# Patient Record
Sex: MALE | Race: WHITE | Employment: UNEMPLOYED | URBAN - METROPOLITAN AREA
[De-identification: names, ages, dates, MRNs, and addresses within clinical notes are randomized per-mention and may not be internally consistent; named-entity substitution may affect disease eponyms.]

---

## 2024-03-01 ENCOUNTER — HOSPITAL ENCOUNTER (OUTPATIENT)
Age: 39
Discharge: HOME | End: 2024-03-01
Payer: COMMERCIAL

## 2024-03-01 ENCOUNTER — APPOINTMENT (OUTPATIENT)
Dept: GENERAL RADIOLOGY | Age: 39
End: 2024-03-01
Attending: PHYSICIAN ASSISTANT
Payer: COMMERCIAL

## 2024-03-01 VITALS
WEIGHT: 240 LBS | BODY MASS INDEX: 36.37 KG/M2 | HEART RATE: 88 BPM | TEMPERATURE: 97 F | OXYGEN SATURATION: 96 % | DIASTOLIC BLOOD PRESSURE: 79 MMHG | RESPIRATION RATE: 16 BRPM | HEIGHT: 68 IN | SYSTOLIC BLOOD PRESSURE: 120 MMHG

## 2024-03-01 DIAGNOSIS — J40 BRONCHITIS: Primary | ICD-10-CM

## 2024-03-01 DIAGNOSIS — H66.90 ACUTE OTITIS MEDIA, UNSPECIFIED OTITIS MEDIA TYPE: ICD-10-CM

## 2024-03-01 DIAGNOSIS — J01.00 ACUTE NON-RECURRENT MAXILLARY SINUSITIS: ICD-10-CM

## 2024-03-01 LAB
POCT INFLUENZA A: NEGATIVE
POCT INFLUENZA B: NEGATIVE
SARS-COV-2 RNA RESP QL NAA+PROBE: NOT DETECTED

## 2024-03-01 PROCEDURE — 99203 OFFICE O/P NEW LOW 30 MIN: CPT | Performed by: PHYSICIAN ASSISTANT

## 2024-03-01 PROCEDURE — 87502 INFLUENZA DNA AMP PROBE: CPT | Performed by: PHYSICIAN ASSISTANT

## 2024-03-01 PROCEDURE — U0002 COVID-19 LAB TEST NON-CDC: HCPCS | Performed by: PHYSICIAN ASSISTANT

## 2024-03-01 PROCEDURE — 71046 X-RAY EXAM CHEST 2 VIEWS: CPT | Performed by: PHYSICIAN ASSISTANT

## 2024-03-01 RX ORDER — BENZONATATE 100 MG/1
100 CAPSULE ORAL 3 TIMES DAILY PRN
Qty: 21 CAPSULE | Refills: 0 | Status: SHIPPED | OUTPATIENT
Start: 2024-03-01 | End: 2024-03-08

## 2024-03-01 RX ORDER — AZITHROMYCIN 250 MG/1
TABLET, FILM COATED ORAL
Qty: 6 TABLET | Refills: 0 | Status: SHIPPED | OUTPATIENT
Start: 2024-03-01 | End: 2024-03-01

## 2024-03-01 RX ORDER — ALBUTEROL SULFATE 90 UG/1
2 AEROSOL, METERED RESPIRATORY (INHALATION) EVERY 4 HOURS PRN
Qty: 1 EACH | Refills: 0 | Status: SHIPPED | OUTPATIENT
Start: 2024-03-01 | End: 2024-03-01

## 2024-03-01 RX ORDER — ALBUTEROL SULFATE 90 UG/1
2 AEROSOL, METERED RESPIRATORY (INHALATION) EVERY 4 HOURS PRN
Qty: 1 EACH | Refills: 0 | Status: SHIPPED | OUTPATIENT
Start: 2024-03-01 | End: 2024-03-31

## 2024-03-01 RX ORDER — AZITHROMYCIN 250 MG/1
TABLET, FILM COATED ORAL
Qty: 6 TABLET | Refills: 0 | Status: SHIPPED | OUTPATIENT
Start: 2024-03-01 | End: 2024-03-06

## 2024-03-01 NOTE — ED INITIAL ASSESSMENT (HPI)
Pt presents to the IC with c/o a cough, fever, fatigue, body aches since Saturday, worse in the last 2 days.

## 2024-03-01 NOTE — ED PROVIDER NOTES
Chief Complaint   Patient presents with    Cough/URI       HPI:     Ghulam Loving is a 38 year old male who presents for evaluation of congestion cough body aches over the last week.  Sick contacts include spouse pending evaluation.  Notes previous history of bronchitis with similar symptoms.  Denies active inhaler use he had previous use with success.  Low-grade temperature last few days with TheraFlu this morning, afebrile on arrival.  Notes sinus congestion and ear pain.  Denies headache dizziness sore throat dysphagia neck pain chest pain shortness of breath abdominal pain vomiting diarrhea dysuria or rash.  Tolerating p.o. okay.      PFSH    PFSH asessment screens reviewed and agree.  Nurses notes reviewed I agree with documentation.    No family history on file.  Family history reviewed with patient/caregiver and is not pertinent to presenting problem.  Social History     Socioeconomic History    Marital status:      Spouse name: Not on file    Number of children: Not on file    Years of education: Not on file    Highest education level: Not on file   Occupational History    Not on file   Tobacco Use    Smoking status: Never    Smokeless tobacco: Never   Substance and Sexual Activity    Alcohol use: Not on file    Drug use: Not on file    Sexual activity: Not on file   Other Topics Concern    Not on file   Social History Narrative    Not on file     Social Determinants of Health     Financial Resource Strain: Not on file   Food Insecurity: Not on file   Transportation Needs: Not on file   Physical Activity: Not on file   Stress: Not on file   Social Connections: Not on file   Housing Stability: Not on file         ROS:   Positive for stated complaint: Nasal congestion, ear pain, cough.  All other systems reviewed and negative except as noted above.  Constitutional and Vital Signs Reviewed.      Physical Exam:     Findings:    /79   Pulse 88   Temp 97.2 °F (36.2 °C) (Temporal)   Resp 16    Ht 172.7 cm (5' 8\")   Wt 108.9 kg   SpO2 96%   BMI 36.49 kg/m²   GENERAL: well developed, well nourished, well hydrated, no distress  SKIN: good skin turgor, no obvious rashes  NECK: No nuchal rigidity.  Supple, no adenopathy  EXTREMITIES: no cyanosis or edema. GRANADOS without difficulty  GI: soft, non-tender, normal bowel sounds  HEAD: normocephalic, atraumatic  EYES: sclera non icteric bilateral, conjunctiva clear  EARS: Mild erythema bilateral inner ear canal.  No effusion.  TMs intact.  No external or mastoid tenderness.  NOSE: Mild maxillary sinus tenderness.  Positive rhinorrhea.  MMM.  Nasal turbinates: pink, normal mucosa  THROAT: clear, without exudates, uvula midline, and airway patent  LUNGS: Coarse lung sounds bases, expiratory wheeze left base.  No retractions.  No rales, rhonchi  NEURO: No focal deficits  PSYCH: Alert and oriented x3.  Answering questions appropriately.  Mood appropriate.    MDM/Assessment/Plan:   Orders for this encounter:    Orders Placed This Encounter    XR CHEST PA + LAT CHEST (XIJ=[id number])     Order Specific Question:   What is the Relevant Clinical Indication / Reason for Exam?     Answer:   cough, r/o infiltrate     Order Specific Question:   Release to patient     Answer:   Immediate    Rapid SARS-CoV-2 by PCR     Order Specific Question:   Release to patient     Answer:   Immediate    POCT Flu Test     Order Specific Question:   Release to patient     Answer:   Immediate    albuterol 108 (90 Base) MCG/ACT Inhalation Aero Soln     Sig: Inhale 2 puffs into the lungs every 4 (four) hours as needed for Wheezing.     Dispense:  1 each     Refill:  0    azithromycin (ZITHROMAX Z-PAYAL) 250 MG Oral Tab     Si mg once followed by 250 mg daily x 4 days     Dispense:  6 tablet     Refill:  0    benzonatate 100 MG Oral Cap     Sig: Take 1 capsule (100 mg total) by mouth 3 (three) times daily as needed for cough.     Dispense:  21 capsule     Refill:  0       Labs performed this  visit:  Recent Results (from the past 10 hour(s))   Rapid SARS-CoV-2 by PCR    Collection Time: 03/01/24  9:14 AM    Specimen: Nares; Other   Result Value Ref Range    Rapid SARS-CoV-2 by PCR Not Detected Not Detected   POCT Flu Test    Collection Time: 03/01/24  9:14 AM    Specimen: Nares; Other   Result Value Ref Range    POCT INFLUENZA A Negative Negative    POCT INFLUENZA B Negative Negative       MDM:  Swabs negative, chest x-ray infiltrate, examination most reflective of sinusitis with associated bronchitis and otitis media.  Patient requesting empiric coverage based on duration of symptoms and exam with antibiotics to cover bacterially.  Instructed likely viral etiology beyond capacity of our facility.  Agrees with bronchodilator with previous use for supportive bronchospasm evaluation.  Also provided cough suppressant in conjunction with therapy.  Happy with plan of care will readdress outpatient or go to the ER for breakthrough changes as advised.    Diagnosis:    ICD-10-CM    1. Bronchitis  J40 XR CHEST PA + LAT CHEST (CPT=71046)     XR CHEST PA + LAT CHEST (CPT=71046)      2. Acute non-recurrent maxillary sinusitis  J01.00       3. Acute otitis media, unspecified otitis media type  H66.90           All results reviewed and discussed with patient.  See AVS for detailed discharge instructions for your condition today.    Follow Up with:  Yazan Pringle MD  [address] [place] [id number] [phone]    Schedule an appointment as soon as possible for a visit in 1 week  As needed, If symptoms worsen

## 2024-04-02 ENCOUNTER — LAB ENCOUNTER (OUTPATIENT)
Dept: LAB | Age: 39
End: 2024-04-02
Attending: STUDENT IN AN ORGANIZED HEALTH CARE EDUCATION/TRAINING PROGRAM
Payer: COMMERCIAL

## 2024-04-02 ENCOUNTER — OFFICE VISIT (OUTPATIENT)
Dept: INTERNAL MEDICINE CLINIC | Facility: CLINIC | Age: 39
End: 2024-04-02

## 2024-04-02 VITALS
WEIGHT: 249 LBS | SYSTOLIC BLOOD PRESSURE: 113 MMHG | OXYGEN SATURATION: 98 % | DIASTOLIC BLOOD PRESSURE: 73 MMHG | TEMPERATURE: 98 F | HEART RATE: 77 BPM | HEIGHT: 68 IN | BODY MASS INDEX: 37.74 KG/M2

## 2024-04-02 DIAGNOSIS — Z00.00 ANNUAL PHYSICAL EXAM: Primary | ICD-10-CM

## 2024-04-02 DIAGNOSIS — J01.90 ACUTE BACTERIAL SINUSITIS: ICD-10-CM

## 2024-04-02 DIAGNOSIS — Z00.00 ANNUAL PHYSICAL EXAM: ICD-10-CM

## 2024-04-02 DIAGNOSIS — G47.33 OSA (OBSTRUCTIVE SLEEP APNEA): ICD-10-CM

## 2024-04-02 DIAGNOSIS — B96.89 ACUTE BACTERIAL SINUSITIS: ICD-10-CM

## 2024-04-02 DIAGNOSIS — E55.9 VITAMIN D DEFICIENCY: ICD-10-CM

## 2024-04-02 DIAGNOSIS — J32.9 SINUSITIS, UNSPECIFIED CHRONICITY, UNSPECIFIED LOCATION: ICD-10-CM

## 2024-04-02 DIAGNOSIS — E66.9 CLASS II OBESITY: ICD-10-CM

## 2024-04-02 DIAGNOSIS — Z76.89 ENCOUNTER TO ESTABLISH CARE WITH NEW DOCTOR: ICD-10-CM

## 2024-04-02 DIAGNOSIS — Z23 NEED FOR TETANUS, DIPHTHERIA, AND ACELLULAR PERTUSSIS (TDAP) VACCINE: ICD-10-CM

## 2024-04-02 DIAGNOSIS — J45.41 MODERATE PERSISTENT ASTHMA WITH ACUTE EXACERBATION (HCC): ICD-10-CM

## 2024-04-02 DIAGNOSIS — E55.9 VITAMIN D DEFICIENCY: Primary | ICD-10-CM

## 2024-04-02 LAB
ALBUMIN SERPL-MCNC: 4.5 G/DL (ref 3.2–4.8)
ALBUMIN/GLOB SERPL: 1.5 {RATIO} (ref 1–2)
ALP LIVER SERPL-CCNC: 82 U/L
ALT SERPL-CCNC: 57 U/L
ANION GAP SERPL CALC-SCNC: 6 MMOL/L (ref 0–18)
AST SERPL-CCNC: 32 U/L (ref ?–34)
ATRIAL RATE: 63 BPM
BASOPHILS # BLD AUTO: 0.06 X10(3) UL (ref 0–0.2)
BASOPHILS NFR BLD AUTO: 0.8 %
BILIRUB SERPL-MCNC: 0.4 MG/DL (ref 0.3–1.2)
BUN BLD-MCNC: 13 MG/DL (ref 9–23)
BUN/CREAT SERPL: 13.4 (ref 10–20)
CALCIUM BLD-MCNC: 9.8 MG/DL (ref 8.7–10.4)
CHLORIDE SERPL-SCNC: 106 MMOL/L (ref 98–112)
CHOLEST SERPL-MCNC: 201 MG/DL (ref ?–200)
CO2 SERPL-SCNC: 27 MMOL/L (ref 21–32)
CREAT BLD-MCNC: 0.97 MG/DL
DEPRECATED RDW RBC AUTO: 38.8 FL (ref 35.1–46.3)
EGFRCR SERPLBLD CKD-EPI 2021: 101 ML/MIN/1.73M2 (ref 60–?)
EOSINOPHIL # BLD AUTO: 0.16 X10(3) UL (ref 0–0.7)
EOSINOPHIL NFR BLD AUTO: 2 %
ERYTHROCYTE [DISTWIDTH] IN BLOOD BY AUTOMATED COUNT: 12.8 % (ref 11–15)
EST. AVERAGE GLUCOSE BLD GHB EST-MCNC: 108 MG/DL (ref 68–126)
FASTING PATIENT LIPID ANSWER: YES
FASTING STATUS PATIENT QL REPORTED: YES
GLOBULIN PLAS-MCNC: 3.1 G/DL (ref 2.8–4.4)
GLUCOSE BLD-MCNC: 94 MG/DL (ref 70–99)
HBA1C MFR BLD: 5.4 % (ref ?–5.7)
HCT VFR BLD AUTO: 46 %
HDLC SERPL-MCNC: 34 MG/DL (ref 40–59)
HGB BLD-MCNC: 15.7 G/DL
IMM GRANULOCYTES # BLD AUTO: 0.02 X10(3) UL (ref 0–1)
IMM GRANULOCYTES NFR BLD: 0.3 %
LDLC SERPL CALC-MCNC: 138 MG/DL (ref ?–100)
LYMPHOCYTES # BLD AUTO: 2.9 X10(3) UL (ref 1–4)
LYMPHOCYTES NFR BLD AUTO: 37.1 %
MCH RBC QN AUTO: 28.9 PG (ref 26–34)
MCHC RBC AUTO-ENTMCNC: 34.1 G/DL (ref 31–37)
MCV RBC AUTO: 84.6 FL
MONOCYTES # BLD AUTO: 0.69 X10(3) UL (ref 0.1–1)
MONOCYTES NFR BLD AUTO: 8.8 %
NEUTROPHILS # BLD AUTO: 3.98 X10 (3) UL (ref 1.5–7.7)
NEUTROPHILS # BLD AUTO: 3.98 X10(3) UL (ref 1.5–7.7)
NEUTROPHILS NFR BLD AUTO: 51 %
NONHDLC SERPL-MCNC: 167 MG/DL (ref ?–130)
OSMOLALITY SERPL CALC.SUM OF ELEC: 288 MOSM/KG (ref 275–295)
P AXIS: 30 DEGREES
P-R INTERVAL: 148 MS
PLATELET # BLD AUTO: 255 10(3)UL (ref 150–450)
POTASSIUM SERPL-SCNC: 4.3 MMOL/L (ref 3.5–5.1)
PROT SERPL-MCNC: 7.6 G/DL (ref 5.7–8.2)
Q-T INTERVAL: 378 MS
QRS DURATION: 78 MS
QTC CALCULATION (BEZET): 386 MS
R AXIS: 58 DEGREES
RBC # BLD AUTO: 5.44 X10(6)UL
SODIUM SERPL-SCNC: 139 MMOL/L (ref 136–145)
T AXIS: 38 DEGREES
T4 FREE SERPL-MCNC: 1.1 NG/DL (ref 0.8–1.7)
TRIGL SERPL-MCNC: 162 MG/DL (ref 30–149)
TSI SER-ACNC: 5.9 MIU/ML (ref 0.55–4.78)
VENTRICULAR RATE: 63 BPM
VIT D+METAB SERPL-MCNC: 22.7 NG/ML (ref 30–100)
VLDLC SERPL CALC-MCNC: 30 MG/DL (ref 0–30)
WBC # BLD AUTO: 7.8 X10(3) UL (ref 4–11)

## 2024-04-02 PROCEDURE — 82306 VITAMIN D 25 HYDROXY: CPT

## 2024-04-02 PROCEDURE — 36415 COLL VENOUS BLD VENIPUNCTURE: CPT

## 2024-04-02 PROCEDURE — 80053 COMPREHEN METABOLIC PANEL: CPT

## 2024-04-02 PROCEDURE — 99204 OFFICE O/P NEW MOD 45 MIN: CPT | Performed by: STUDENT IN AN ORGANIZED HEALTH CARE EDUCATION/TRAINING PROGRAM

## 2024-04-02 PROCEDURE — 90715 TDAP VACCINE 7 YRS/> IM: CPT | Performed by: STUDENT IN AN ORGANIZED HEALTH CARE EDUCATION/TRAINING PROGRAM

## 2024-04-02 PROCEDURE — 83036 HEMOGLOBIN GLYCOSYLATED A1C: CPT

## 2024-04-02 PROCEDURE — 85025 COMPLETE CBC W/AUTO DIFF WBC: CPT

## 2024-04-02 PROCEDURE — 84443 ASSAY THYROID STIM HORMONE: CPT

## 2024-04-02 PROCEDURE — 84439 ASSAY OF FREE THYROXINE: CPT

## 2024-04-02 PROCEDURE — 90471 IMMUNIZATION ADMIN: CPT | Performed by: STUDENT IN AN ORGANIZED HEALTH CARE EDUCATION/TRAINING PROGRAM

## 2024-04-02 PROCEDURE — 80061 LIPID PANEL: CPT

## 2024-04-02 PROCEDURE — 99386 PREV VISIT NEW AGE 40-64: CPT | Performed by: STUDENT IN AN ORGANIZED HEALTH CARE EDUCATION/TRAINING PROGRAM

## 2024-04-02 PROCEDURE — 93005 ELECTROCARDIOGRAM TRACING: CPT

## 2024-04-02 PROCEDURE — 93010 ELECTROCARDIOGRAM REPORT: CPT | Performed by: STUDENT IN AN ORGANIZED HEALTH CARE EDUCATION/TRAINING PROGRAM

## 2024-04-02 RX ORDER — FLUTICASONE PROPIONATE AND SALMETEROL 100; 50 UG/1; UG/1
1 POWDER RESPIRATORY (INHALATION) 2 TIMES DAILY
Qty: 60 EACH | Refills: 3 | Status: SHIPPED | OUTPATIENT
Start: 2024-04-02 | End: 2025-04-02

## 2024-04-02 RX ORDER — AMOXICILLIN AND CLAVULANATE POTASSIUM 875; 125 MG/1; MG/1
1 TABLET, FILM COATED ORAL 2 TIMES DAILY
Qty: 20 TABLET | Refills: 0 | Status: SHIPPED | OUTPATIENT
Start: 2024-04-02 | End: 2024-04-12

## 2024-04-02 RX ORDER — METHYLPREDNISOLONE 4 MG/1
TABLET ORAL
Qty: 1 EACH | Refills: 0 | Status: SHIPPED | OUTPATIENT
Start: 2024-04-02

## 2024-04-02 RX ORDER — FLUTICASONE PROPIONATE 50 MCG
2 SPRAY, SUSPENSION (ML) NASAL DAILY
Qty: 16 EACH | Refills: 3 | Status: SHIPPED | OUTPATIENT
Start: 2024-04-02

## 2024-04-02 RX ORDER — CHOLECALCIFEROL (VITAMIN D3) 125 MCG
1 CAPSULE ORAL DAILY
Qty: 90 CAPSULE | Refills: 3 | Status: SHIPPED | OUTPATIENT
Start: 2024-04-02 | End: 2025-03-28

## 2024-04-02 RX ORDER — AZELASTINE HYDROCHLORIDE 137 UG/1
1-2 SPRAY, METERED NASAL 2 TIMES DAILY
Qty: 30 ML | Refills: 3 | Status: SHIPPED | OUTPATIENT
Start: 2024-04-02
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
## 2024-04-02 NOTE — PROGRESS NOTES
No action needed if read by patient:  Adryan Parmar, your EKG shows normal sinus rhythm. This is a good baseline to have should we decide to start phentermine/ (weight loss medication).

## 2024-04-02 NOTE — PATIENT INSTRUCTIONS
I recommend to eat a more balanced mediterranean diet (eat more vegetables and fruits) more omega 3 fatty acids, lean protein (chicken, turkey, seafood), less process/fried fatty foods, less red met, and mixed aerobic exercise 180 minutes a week and intermittent strength training. And follow up in 3 months     Nutritional Goals Reviewed and Discussed:   Limit Carbohydrates to 100gm per day, Eat 100-200 calories within 1 hour of awkaing up, Eat 3-4 cups of fresh fruits or vegetables daily    Behavior Modification Reviewed and Discussed:  Eat breakfast, Eat 3 meals per day, Plan meals in advance (if unable to cook, meal prep service such as [email or web address]) High protein, Low simple carbs. Read nutrition labels track calories and Macros with [employer] or [employer] barbara (thus daily food journal), No drinking 30mintutes before or after meals, utilize portion control strategies to reduce caloric intake, Identify triggers for eating and manage cues, and Eat Slowly and take 20-30 minutes to complete each meal.    Goal for Next Month:  Keep Food log  Increase Cardiac/cardio exercise at least 150 minutes a week (at least 3 times a week) would prefer if enrolls in fitness classes or  at least 1x a week  Drink 48-64 ounces of non-caloric beverages per day. No fruit juices or regular soda.  Increase activity- upper body exercises in addition to cardio and, aim to walk 10minutes per day after dinner  Increase fruit and vegetable servings to 5-6 per day.

## 2024-04-02 NOTE — PROGRESS NOTES
Please relay to patient:  Hello, after review of your labs here are your recommendations:    # Lipids/cholesterol: Your LDL is elevated however cholesterol medications are not required because your LDL value is under the cut off of 190. We typically start cholesterol medications after the age of 40 if your 10 year risk, ie the ASCVD, is greater than 5%. We will continue to monitor these values yearly and I will let you know if there are any changes to these recommendations/your value reaches greater than 5%.  Elevated cholesterol/LDL, in the absence of poor diet, is typically genetic. For now, please continue with healthy diet and exercise such as the mediterranean diet.    The 10-year ASCVD risk score (Caryn GUTIERREZ, et al., 2019) is: 1.6%    Values used to calculate the score:      Age: 38 years      Sex: Male      Is Non- : No      Diabetic: No      Tobacco smoker: No      Systolic Blood Pressure: 113 mmHg      Is BP treated: No      HDL Cholesterol: 34 mg/dL      Total Cholesterol: 201 mg/dL      # CMP: Your comprehensive metabolic panel shows overall stable functioning kidneys (creatinine, GFR), liver (AST, ALT, Bilirubin), and electrolytes (sodium, potassium, calcium). Slight variations in other values such as BUN/Creat, Serum Osm, anion gap, chloride, etc are not of clinical value at this time.     # CBC: Your complete blood count shows overall stable red blood cells, white blood cells, platelets (help you stop bleeding), and hematocrit (thickness of blood),  Slight variations in other values such as RDW/sw, MCH are not of clinical value at this time.     # TSH: Your thyroid function is stable but your lab value TSH is a bit high. This is typically a lab issue associated with taking multivitamins, B-vitamins, or biotin. Since your Free T3 and Free T4 are normal, ie the active thyroid hormones, and you are feeling well, nothing further needs to be done. We can continue to monitor this value  over the next few months/with your next blood draw or sooner if clinically indicated.     # Vitamins: Your vitamin D level is low. If not already taking, please start over-the-counter vitamin D3 5000 IU daily with meals to help replace and maintain your Vitamin D level. If you are taking 5,000 units let me know for further dose instructions. We will recheck your Vitamin D level yearly, sooner if clinically indicated such as osteoporosis.     # Diabetes/A1C: Your A1C Last A1c value was 5.4% done 4/2/2024. which shows  no diabetes. We will recheck this value yearly, sooner if clinically indicated.       If you have any questions or concerns in regards to these labs please let me know.  -Dr. Pringle

## 2024-06-15 ENCOUNTER — OFFICE VISIT (OUTPATIENT)
Dept: SLEEP CENTER | Age: 39
End: 2024-06-15
Attending: STUDENT IN AN ORGANIZED HEALTH CARE EDUCATION/TRAINING PROGRAM
Payer: COMMERCIAL

## 2024-06-15 DIAGNOSIS — G47.33 OSA (OBSTRUCTIVE SLEEP APNEA): ICD-10-CM

## 2024-06-15 PROCEDURE — 95810 POLYSOM 6/> YRS 4/> PARAM: CPT

## 2024-06-26 DIAGNOSIS — J01.90 ACUTE BACTERIAL SINUSITIS: ICD-10-CM

## 2024-06-26 DIAGNOSIS — J32.9 SINUSITIS, UNSPECIFIED CHRONICITY, UNSPECIFIED LOCATION: ICD-10-CM

## 2024-06-26 DIAGNOSIS — B96.89 ACUTE BACTERIAL SINUSITIS: ICD-10-CM

## 2024-06-28 RX ORDER — AZELASTINE 1 MG/ML
SPRAY, METERED NASAL
Qty: 30 ML | Refills: 3 | Status: SHIPPED | OUTPATIENT
Start: 2024-06-28

## 2024-06-28 RX ORDER — FLUTICASONE PROPIONATE 50 MCG
SPRAY, SUSPENSION (ML) NASAL
Qty: 16 G | Refills: 3 | Status: SHIPPED | OUTPATIENT
Start: 2024-06-28

## 2024-06-29 NOTE — TELEPHONE ENCOUNTER
Refill Passed Per Protocol    Requested Prescriptions   Pending Prescriptions Disp Refills    FLUTICASONE PROPIONATE 50 MCG/ACT Nasal Suspension [Pharmacy Med Name: FLUTICASONE 50MCG NASAL SP (120) RX] 16 g 0     Sig: SPRAY TWICE IN EACH NOSTRIL DAILY, FOR NASAL CONGESTION, SINUS SYMPTOMS       Allergy Medication Protocol Passed - 6/26/2024  5:50 AM        Passed - In person appointment or virtual visit in the past 12 mos or appointment in next 3 mos     Recent Outpatient Visits              1 week ago BHAVANA (obstructive sleep apnea)    [facility]    Office Visit    2 months ago Annual physical exam    [facility] Yazan Pringle MD    Office Visit          Future Appointments         Provider Department Appt Notes    In 3 months Marcos Bains PA-C [facility] BHAVANA  (Policy informed)                      AZELASTINE 0.1 % Nasal Solution [Pharmacy Med Name: AZELASTINE 0.1%(137MCG) NASAL-200SP] 30 mL 3     Sig: USE 1 TO 2 SPRAYS NASALLY IN THE MORNING AND 1 TO 2 SPRAYS BEFORE BEDTIME FOR SINUS SYMPTOMS/NASAL CONGESTION       Allergy Medication Protocol Passed - 6/26/2024  5:50 AM        Passed - In person appointment or virtual visit in the past 12 mos or appointment in next 3 mos     Recent Outpatient Visits              1 week ago BHAVANA (obstructive sleep apnea)    [facility]    Office Visit    [facility] months [facility]          Future Appointments         Provider Department Appt Notes    In 3 months Marcos Bains PA-C [facility] BHAVANA  (Policy informed)                         Future Appointments         Provider Department Appt Notes    In 3 months Marcos Bains PA-C [facility] BHAVANA  (Policy informed)           Recent Outpatient Visits              1 week ago BHAVANA (obstructive sleep apnea)    [facility]    Office Visit    2 months ago Annual physical exam    [facility] [place] Yazan Pringle MD    Office Visit

## 2024-10-24 ENCOUNTER — OFFICE VISIT (OUTPATIENT)
Dept: PULMONOLOGY | Facility: CLINIC | Age: 39
End: 2024-10-24
Payer: COMMERCIAL

## 2024-10-24 VITALS
BODY MASS INDEX: 36.37 KG/M2 | HEIGHT: 68 IN | OXYGEN SATURATION: 97 % | DIASTOLIC BLOOD PRESSURE: 74 MMHG | WEIGHT: 240 LBS | SYSTOLIC BLOOD PRESSURE: 116 MMHG | RESPIRATION RATE: 14 BRPM | HEART RATE: 86 BPM

## 2024-10-24 DIAGNOSIS — G47.33 OSA (OBSTRUCTIVE SLEEP APNEA): Primary | ICD-10-CM

## 2024-10-24 PROCEDURE — 3078F DIAST BP <80 MM HG: CPT | Performed by: PHYSICIAN ASSISTANT

## 2024-10-24 PROCEDURE — 3008F BODY MASS INDEX DOCD: CPT | Performed by: PHYSICIAN ASSISTANT

## 2024-10-24 PROCEDURE — 3074F SYST BP LT 130 MM HG: CPT | Performed by: PHYSICIAN ASSISTANT

## 2024-10-24 PROCEDURE — 99243 OFF/OP CNSLTJ NEW/EST LOW 30: CPT | Performed by: PHYSICIAN ASSISTANT

## 2024-10-24 NOTE — PROGRESS NOTES
Pulmonary Consult Note    History of Present Illness:  Ghulam Loving is a 38 year old male presenting to pulmonary clinic today for BHAVANA, referred by Dr. Pringle. Polysomnography 6/2024 demonstrated severe BHAVANA with combined AHI 70. He was diagnosed with mild BHAVANA a few years ago and tried oral appliance therapy which he did not tolerate. He also had nasal surgery including septoplasty. He has a brand new CPAP device at home from his late mother in law that he intends to use but will need supplies. The patient describes unrefreshing sleep and daytime fatigue. There is snoring. There is gasping for air. He goes to bed at 1 AM, falls asleep within 30 minutes, and awakens at 8:30 AM. He has frequent nocturnal awakenings due to tossing and turning and chronic nasal congestion. There are daily AM headaches. There is no drowsy driving. There is occasional alcohol use. There is no depression. There is no urge to move the limbs, cataplexy, hypnagogic hallucinations, or sleep paralysis. [place] Sleepiness Scale score is 10/24.    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  HEENT: +Nasal congestion.  Cardio: No chest pain.  Respiratory: No shortness of breath.  GI: No acid reflux.  Extremities: No lower extremity swelling or pain.  Neurologic: +Frequent headaches.  Psych: No depression.     Physical Exam:  /74   Pulse 86   Resp 14   Ht 5' 8\" (1.727 m)   Wt 240 lb (108.9 kg)   SpO2 97%   BMI 36.49 kg/m²    Constitutional: Obese. No acute distress.  HEENT: Head NC/AT. PEERL.  No tonsillar or uvula enlargement. Mallampati class 1.  Cardio: Regular rate and rhythm. Normal S1 and S2. No murmurs.   Respiratory: Thorax symmetrical with no labored breathing. Clear to ausculation bilaterally with symmetrical breath sounds. No wheezing, rhonchi, or crackles.   Extremities: No clubbing or cyanosis. No LE edema. No calf tenderness.  Neurologic: A&Ox3. No gross motor deficits.  Skin: Warm, dry.  Psych: Pleasant affect. Cooperative.    Results:  -Polysomnography 6/2024: AHI 70, REM AHI 25, supine AHI 91.8, oxygen so 79%    Assessment/Plan:  Severe BHAVANA - Severe BHAVANA with significant clinical syndrome. He has new CPAP at home from family member that he intends to use if possible. Discussed risks of untreated BHAVANA including increased risk of cardiovascular and cerebrovascular events.  Plan:   -CPAP titration study  -Eventual CPAP initiation with the machine he has at home  -Weight loss  -Avoid alcohol  -Avoid sedating drugs  -Never drive if sleepy  -Follow-up 2-3 months after CPAP initiation    Marcos Bains PA-C  Pulmonary Medicine  10/24/2024

## 2024-10-29 ENCOUNTER — OFFICE VISIT (OUTPATIENT)
Dept: INTERNAL MEDICINE CLINIC | Facility: CLINIC | Age: 39
End: 2024-10-29
Payer: COMMERCIAL

## 2024-10-29 VITALS
BODY MASS INDEX: 37.13 KG/M2 | WEIGHT: 245 LBS | HEIGHT: 68 IN | DIASTOLIC BLOOD PRESSURE: 74 MMHG | HEART RATE: 77 BPM | SYSTOLIC BLOOD PRESSURE: 114 MMHG | TEMPERATURE: 99 F

## 2024-10-29 DIAGNOSIS — J32.9 RECURRENT SINUSITIS: Primary | ICD-10-CM

## 2024-10-29 DIAGNOSIS — Z98.890 HISTORY OF EAR, NOSE, AND THROAT (ENT) SURGERY: ICD-10-CM

## 2024-10-29 DIAGNOSIS — Z88.9 DRUG ALLERGY: ICD-10-CM

## 2024-10-29 PROCEDURE — 3074F SYST BP LT 130 MM HG: CPT | Performed by: STUDENT IN AN ORGANIZED HEALTH CARE EDUCATION/TRAINING PROGRAM

## 2024-10-29 PROCEDURE — 3008F BODY MASS INDEX DOCD: CPT | Performed by: STUDENT IN AN ORGANIZED HEALTH CARE EDUCATION/TRAINING PROGRAM

## 2024-10-29 PROCEDURE — 3078F DIAST BP <80 MM HG: CPT | Performed by: STUDENT IN AN ORGANIZED HEALTH CARE EDUCATION/TRAINING PROGRAM

## 2024-10-29 PROCEDURE — 99213 OFFICE O/P EST LOW 20 MIN: CPT | Performed by: STUDENT IN AN ORGANIZED HEALTH CARE EDUCATION/TRAINING PROGRAM

## 2024-10-29 RX ORDER — METHYLPREDNISOLONE 4 MG/1
TABLET ORAL
Qty: 1 EACH | Refills: 0 | Status: SHIPPED | OUTPATIENT
Start: 2024-10-29

## 2024-10-29 RX ORDER — AZELASTINE HYDROCHLORIDE 137 UG/1
1-2 SPRAY, METERED NASAL 2 TIMES DAILY
Qty: 30 ML | Refills: 11 | Status: SHIPPED | OUTPATIENT
Start: 2024-10-29

## 2024-10-29 RX ORDER — FLUTICASONE PROPIONATE 50 MCG
2 SPRAY, SUSPENSION (ML) NASAL DAILY
Qty: 16 EACH | Refills: 11 | Status: SHIPPED | OUTPATIENT
Start: 2024-10-29

## 2024-10-29 RX ORDER — DOXYCYCLINE 100 MG/1
100 CAPSULE ORAL 2 TIMES DAILY
Qty: 20 CAPSULE | Refills: 0 | Status: SHIPPED | OUTPATIENT
Start: 2024-10-29 | End: 2024-11-08

## 2024-10-29 NOTE — PROGRESS NOTES
OFFICE NOTE     Patient ID: Ghulam Loving is a 38 year old male.  Today's Date: 10/29/24  Chief Complaint: Sore Throat (X2 days Sore throat burning sensation. Covid neg test last night. This morning coughed up bloody yellow mucous)      Pt is a 37y/o male with PMHx of BHAVANA (on CPAP followed by bam Bains PA-C) Chronic Sinusitis (on nasal lavage every night) whom presents to clinic with recent URI    Sinusitis  This is a recurrent problem. The current episode started in the past 7 days. The problem has been gradually worsening since onset. There has been no fever. Associated symptoms include chills, congestion, coughing, headaches, sinus pressure, a sore throat and swollen glands.         Vitals:    10/29/24 1436   BP: 114/74   Pulse: 77   Temp: 98.5 °F (36.9 °C)   TempSrc: Oral   Weight: 245 lb (111.1 kg)   Height: 5' 8\" (1.727 m)     body mass index is 37.25 kg/m².  BP Readings from Last 3 Encounters:   10/29/24 114/74   10/24/24 116/74   04/02/24 113/73     The 10-year ASCVD risk score (Caryn DK, et al., 2019) is: 1.6%    Values used to calculate the score:      Age: 38 years      Sex: Male      Is Non- : No      Diabetic: No      Tobacco smoker: No      Systolic Blood Pressure: 114 mmHg      Is BP treated: No      HDL Cholesterol: 34 mg/dL      Total Cholesterol: 201 mg/dL      Medications reviewed:  Current Outpatient Medications   Medication Sig Dispense Refill    azelastine 137 MCG/SPRAY Nasal Solution 1-2 sprays by Nasal route in the morning and 1-2 sprays before bedtime. FOR SINUS SYMPTOMS/NASAL CONGESTION.. 30 mL 11    fluticasone propionate 50 MCG/ACT Nasal Suspension 2 sprays by Each Nare route daily. FOR NASAL CONGESTION/SINUS SYMPTOMS. 16 each 11    methylPREDNISolone 4 MG Oral Tablet Therapy Pack Take as directed with food. 1 each 0    doxycycline 100 MG Oral Cap Take 1 capsule (100 mg total) by mouth 2 (two) times daily for 10 days. 20 capsule 0     Ergocalciferol (VITAMIN D OR) Take by mouth daily.      Albuterol Sulfate 108 (90 Base) MCG/ACT Inhalation Aerosol Powder, Breath Activated Inhale into the lungs. 2 puffs inhalation every 4 hours           Assessment & Plan    1. Recurrent sinusitis (Primary)  -     Azelastine HCl; 1-2 sprays by Nasal route in the morning and 1-2 sprays before bedtime. FOR SINUS SYMPTOMS/NASAL CONGESTION..  Dispense: 30 mL; Refill: 11  -     Fluticasone Propionate; 2 sprays by Each Nare route daily. FOR NASAL CONGESTION/SINUS SYMPTOMS.  Dispense: 16 each; Refill: 11  -     methylPREDNISolone; Take as directed with food.  Dispense: 1 each; Refill: 0  -     Doxycycline Hyclate; Take 1 capsule (100 mg total) by mouth 2 (two) times daily for 10 days.  Dispense: 20 capsule; Refill: 0  -     ENT Referral - In Network  2. Drug allergy  -     [place] [place]  3. History of ear, nose, and throat (ENT) surgery  Comments:  history of cauterized nose  Orders:  -     ENT Referral - In Network  Patient presenting URI and now sinus congestion tenderness and erythematous nasal turbinates consistent with acute secondary acute bacterial rhinosinusitis.  Plan:  -Use Flonase and Azelastine 1 puff each nostril daily for next month or till symptom resolves  Doxycycline 100mg po BID for 10days (due to PCN allergy), , pt educated while taking antibiotics should also take OTC priobiotics daily and/or natural probiotics such as greek yogurt/Kefir to help prevent development of C.Diff.  -refer to allergist given PCN? Allergy testing  -Start medrol dose pack for severe inflammation  -for sore throat, educated to purchase OTC benzocaine lozenges for laryngitis/pharyngitis as well  -take hot showers, drink tea and increase fluid intake   -If no improvement, referral given to ENT for further evaluation and evaluation of possible nasal polyp or if CT sinus warranted  -If no improvement also able to follow up with myself follow up in 10-14  days if needs antibiotic duration and agent if warranted       Follow Up: As needed/if symptoms worsen or No follow-ups on file..     Objective/ Results:   Physical Exam  Constitutional:       Appearance: He is well-developed.   HENT:      Nose: Congestion present.      Right Turbinates: Enlarged and swollen.      Left Turbinates: Enlarged and swollen.      Right Sinus: Frontal sinus tenderness present.      Left Sinus: Frontal sinus tenderness present.   Eyes:      Extraocular Movements: Extraocular movements intact.      Pupils: Pupils are equal, round, and reactive to light.   Cardiovascular:      Rate and Rhythm: Normal rate and regular rhythm.      Heart sounds: Normal heart sounds.   Pulmonary:      Effort: Pulmonary effort is normal.      Breath sounds: Normal breath sounds.   Abdominal:      General: Bowel sounds are normal.      Palpations: Abdomen is soft.   Skin:     General: Skin is warm and dry.   Neurological:      Mental Status: He is alert and oriented to person, place, and time.      Deep Tendon Reflexes: Reflexes are normal and symmetric.        Reviewed:    Patient Active Problem List    Diagnosis    BHAVANA (obstructive sleep apnea)      Allergies[1]     Social History     Socioeconomic History    Marital status:    Tobacco Use    Smoking status: Former     Current packs/day: 0.00     Types: Cigarettes     Quit date:      Years since quittin.8     Passive exposure: Past    Smokeless tobacco: Never   Vaping Use    Vaping status: Never Used   Substance and Sexual Activity    Alcohol use: Yes     Comment: rare/social    Drug use: Never    Sexual activity: Yes     Partners: Female      Review of Systems   Constitutional:  Positive for chills.   HENT:  Positive for congestion, postnasal drip, sinus pressure, sore throat and trouble swallowing.    Respiratory:  Positive for cough.    Cardiovascular: Negative.    Gastrointestinal: Negative.    Skin: Negative.    Neurological:  Positive for  headaches.     All other systems negative unless otherwise stated in ROS or HPI above.       Yazan Pringle MD  Internal Medicine       Call office with any questions or seek emergency care if necessary.   Patient understands and agrees to follow directions and advice.      ----------------------------------------- PATIENT INSTRUCTIONS-----------------------------------------     There are no Patient Instructions on file for this visit.        The 21st Century Cures Act makes medical notes available to patients in the interest of transparency.  However, please be advised that this is a medical document.  It is intended as a peer to peer communication.  It is written in medical language and may contain abbreviations or verbiage that are technical and unfamiliar.  It may appear blunt or direct.  Medical documents are intended to carry relevant information, facts as evident, and the clinical opinion of the practitioner.        [1]   Allergies  Allergen Reactions    Seasonal OTHER (SEE COMMENTS)     congestion

## 2024-10-31 ENCOUNTER — OFFICE VISIT (OUTPATIENT)
Dept: OTOLARYNGOLOGY | Facility: CLINIC | Age: 39
End: 2024-10-31

## 2024-10-31 VITALS — HEIGHT: 68 IN | BODY MASS INDEX: 37.13 KG/M2 | WEIGHT: 245 LBS

## 2024-10-31 DIAGNOSIS — J32.9 RECURRENT SINUSITIS: ICD-10-CM

## 2024-10-31 DIAGNOSIS — M26.19 RETROGNATHIA: ICD-10-CM

## 2024-10-31 DIAGNOSIS — G47.33 OSA (OBSTRUCTIVE SLEEP APNEA): ICD-10-CM

## 2024-10-31 DIAGNOSIS — R09.82 PND (POST-NASAL DRIP): ICD-10-CM

## 2024-10-31 DIAGNOSIS — J34.2 NASAL SEPTAL DEVIATION: Primary | ICD-10-CM

## 2024-10-31 PROCEDURE — 3008F BODY MASS INDEX DOCD: CPT | Performed by: STUDENT IN AN ORGANIZED HEALTH CARE EDUCATION/TRAINING PROGRAM

## 2024-10-31 PROCEDURE — 31231 NASAL ENDOSCOPY DX: CPT | Performed by: STUDENT IN AN ORGANIZED HEALTH CARE EDUCATION/TRAINING PROGRAM

## 2024-10-31 PROCEDURE — 99244 OFF/OP CNSLTJ NEW/EST MOD 40: CPT | Performed by: STUDENT IN AN ORGANIZED HEALTH CARE EDUCATION/TRAINING PROGRAM

## 2024-10-31 NOTE — PROGRESS NOTES
Ghulam Loving is a 38 year old male.   Chief Complaint   Patient presents with    New Patient    Sinus Problem      Recurrent sinusitis     HPI:   38-year-old presents with multiple complaints including severe sleep apnea, recurrent sinusitis and nasal obstruction.  Had a [facility] at [facility] with a balloon sinuplasty and turbinate reduction.  Reports he has persistent nasal obstruction where he mouth breathes.  Has tried an oral appliance but this falls out due to mouth breathing.  Has retrognathia.  Is in the process of getting sleep    Current Outpatient Medications   Medication Sig Dispense Refill    azelastine 137 MCG/SPRAY Nasal Solution 1-2 sprays by Nasal route in the morning and 1-2 sprays before bedtime. FOR SINUS SYMPTOMS/NASAL CONGESTION.. 30 mL 11    fluticasone propionate 50 MCG/ACT Nasal Suspension 2 sprays by Each Nare route daily. FOR NASAL CONGESTION/SINUS SYMPTOMS. 16 each 11    methylPREDNISolone 4 MG Oral Tablet Therapy Pack Take as directed with food. 1 each 0    doxycycline 100 MG Oral Cap Take 1 capsule (100 mg total) by mouth 2 (two) times daily for 10 days. 20 capsule 0    Ergocalciferol (VITAMIN D OR) Take by mouth daily.      Albuterol Sulfate 108 (90 Base) MCG/ACT Inhalation Aerosol Powder, Breath Activated Inhale into the lungs. 2 puffs inhalation every 4 hours        History reviewed. No pertinent past medical history.   Social History:  Social History     Socioeconomic History    Marital status:    Tobacco Use    Smoking status: Former     Current packs/day: 0.00     Types: Cigarettes     Quit date:      Years since quittin.8     Passive exposure: Past    Smokeless tobacco: Never   Vaping Use    Vaping status: Never Used   Substance and Sexual Activity    Alcohol use: Yes     Comment: rare/social    Drug use: Never    Sexual activity: Yes     Partners: Female      Past Surgical History:   Procedure Laterality Date    Appendectomy      Other surgical history  Bilateral 2022    cauterized nose, veins         EXAM:   Ht 5' 8\" (1.727 m)   Wt 245 lb (111.1 kg)   BMI 37.25 kg/m²     System Details   Skin Inspection - Normal.   Constitutional Overall appearance - Normal.   Head/Face Symmetric, TMJ tenderness not present    Eyes EOMI, PERRL   Right ear:  Canal clear, TM intact, no ALBERTO   Left ear:  Canal clear, TM intact, no ALBERTO   Nose: Septum deviated to the right to moderate degree, inferior turbinates not enlarged, nasal valves without collapse    Oral cavity/Oropharynx: No lesions or masses on inspection or palpation, tonsils symmetric    Neck: Soft without LAD, thyroid not enlarged  Voice clear/ no stridor   Other:      SCOPES AND PROCEDURES:     Nasal Endoscopy Procedure Note     Due to inability for adequate examination of the nose and nasopharynx and need for magnification to perform the examination, endoscopy was performed.  Risks and benefits were discussed with patient/family and they have given verbal consent to proceed.    Pre-operative Diagnosis:   1. Nasal septal deviation    2. BHAVANA (obstructive sleep apnea)    3. Retrognathia    4. Recurrent sinusitis    5. PND (post-nasal drip)        Post-operative Diagnosis: Same    Procedure: Diagnostic nasal endoscopy    Anesthesia: Topical anesthetic [place]     Surgeon Win Javier MD    EBL: 0cc    Procedure Detail & Findings:     After placement of topical anesthetic intranasally the endoscope was inserted into each nares and driven through the nasal cavity into the nasopharynx. The following findings were noted:    Septum: Deviated of the right  Inferior turbinates: Normal  Middle meatus: Patent  Middle turbinates: Normal  Purulence: Possible mucopurulence in the left middle meatus  Polyps: None noted  Nasopharynx and eustachian tube: No masses  Other: The middle and superior meatus, the turbinates, and the spheno-ethmoid recess were inspected and seen to be without significant abnormal findings.     Condition:  Stable    Complications: Patient tolerated the procedure well with no immediate complication.    Win Javier MD    AUDIOGRAM AND IMAGING:         IMPRESSION:   1. Nasal septal deviation    2. BHAVANA (obstructive sleep apnea)    3. Retrognathia    4. Recurrent sinusitis    5. PND (post-nasal drip)       Recommendations:  -Chronic nasal obstruction despite previous ENT procedure.  They did not correct his septum he has a moderate septal deviation to the right this could be affecting his nasal breathing and possible future CPAP  -Has retrognathia as well but has tried an oral appliance without relief due to his nasal obstruction and mouth breathing causing it to fall while  -We agreed to see how he does with the CPAP likely a fullface mask and can use the Flonase and Astelin together prior to sleep.  If he is struggling with his nasal obstruction are not tolerating the CPAP could more strongly consider septoplasty.  We had a discussion regarding septoplasty and risk benefits and alternatives of this  -If he has any recurrence of sinusitis he can come back to see me.  Currently appears to be recovering with the antibiotic treatment per his PCP    The patient indicates understanding of these issues and agrees to the plan.      Win Javier MD  10/31/2024  2:02 PM

## 2024-12-21 ENCOUNTER — OFFICE VISIT (OUTPATIENT)
Dept: SLEEP CENTER | Age: 39
End: 2024-12-21
Attending: PHYSICIAN ASSISTANT
Payer: COMMERCIAL

## 2024-12-21 DIAGNOSIS — G47.33 OSA (OBSTRUCTIVE SLEEP APNEA): Primary | ICD-10-CM

## 2024-12-21 PROCEDURE — 95811 POLYSOM 6/>YRS CPAP 4/> PARM: CPT

## 2024-12-24 ENCOUNTER — LAB ENCOUNTER (OUTPATIENT)
Dept: LAB | Age: 39
End: 2024-12-24
Attending: ALLERGY & IMMUNOLOGY
Payer: COMMERCIAL

## 2024-12-24 ENCOUNTER — OFFICE VISIT (OUTPATIENT)
Dept: ALLERGY | Facility: CLINIC | Age: 39
End: 2024-12-24
Payer: COMMERCIAL

## 2024-12-24 VITALS
HEIGHT: 68.5 IN | TEMPERATURE: 98 F | HEART RATE: 92 BPM | WEIGHT: 252 LBS | SYSTOLIC BLOOD PRESSURE: 132 MMHG | RESPIRATION RATE: 20 BRPM | OXYGEN SATURATION: 97 % | BODY MASS INDEX: 37.76 KG/M2 | DIASTOLIC BLOOD PRESSURE: 87 MMHG

## 2024-12-24 DIAGNOSIS — R60.0 FACIAL EDEMA: ICD-10-CM

## 2024-12-24 DIAGNOSIS — T78.2XXA ANAPHYLAXIS, INITIAL ENCOUNTER: ICD-10-CM

## 2024-12-24 DIAGNOSIS — Z88.0 HISTORY OF PENICILLIN-TYPE ANTIBIOTIC ALLERGY: ICD-10-CM

## 2024-12-24 DIAGNOSIS — T78.2XXA ANAPHYLAXIS, INITIAL ENCOUNTER: Primary | ICD-10-CM

## 2024-12-24 DIAGNOSIS — L53.9 FACIAL ERYTHEMA: ICD-10-CM

## 2024-12-24 PROCEDURE — 3079F DIAST BP 80-89 MM HG: CPT | Performed by: ALLERGY & IMMUNOLOGY

## 2024-12-24 PROCEDURE — 3008F BODY MASS INDEX DOCD: CPT | Performed by: ALLERGY & IMMUNOLOGY

## 2024-12-24 PROCEDURE — 86003 ALLG SPEC IGE CRUDE XTRC EA: CPT

## 2024-12-24 PROCEDURE — 3075F SYST BP GE 130 - 139MM HG: CPT | Performed by: ALLERGY & IMMUNOLOGY

## 2024-12-24 PROCEDURE — 99205 OFFICE O/P NEW HI 60 MIN: CPT | Performed by: ALLERGY & IMMUNOLOGY

## 2024-12-24 PROCEDURE — 36415 COLL VENOUS BLD VENIPUNCTURE: CPT

## 2024-12-24 NOTE — PATIENT INSTRUCTIONS
We we will send patient for blood test-RAST testing-to check for possible type I allergy to penicillin or amoxicillin.    If negative, patient will consider scheduling oral allergy challenge to amoxicillin in our [place] allergy clinic in [facility].    Patient will follow-up in 2 to 3 weeks.

## 2024-12-24 NOTE — H&P
Ghulam Loving is a 39 year old male.    HPI:     Chief Complaint   Patient presents with    Allergies     New patient.  Patient presents with concern of facial edema/erythema that occurred after taking Augmentin and Medrol Dose Wesley 2024.      The patient is a 39-year-old male who was referred by Dr. Pringle for new consult for evaluation of possible anaphylaxis to Augmentin-antibiotic.    Patient has a long history of chronic rhinosinusitis.  In 2024, patient developed signs and symptoms of acute/chronic sinus infection.  Patient was prescribed oral Augmentin, plus a Medrol Dosepak.  Approximately 3 to 4 days after beginning the treatment, patient developed significant facial edema with erythema.  Interestingly, the patient continued the treatment; and the facial edema/erythema resolved in 3 to 4 days.    The patient denies associated symptoms including urticaria, angioedema, dysphagia, voice changes, shortness of breath, cough, wheeze, nausea, emesis, diarrhea.        HISTORY:  History reviewed. No pertinent past medical history.   Past Surgical History:   Procedure Laterality Date    Appendectomy      Other surgical history Bilateral     cauterized nose, veins      Family History   Problem Relation Age of Onset    Asthma Mother     Hypertension Father     Stroke Father     Diabetes Maternal Grandmother     Other (CHF) Paternal Grandmother     Other (Kidney failure) Paternal Grandfather       Social History:   Social History     Socioeconomic History    Marital status:    Tobacco Use    Smoking status: Former     Current packs/day: 0.00     Types: Cigarettes     Quit date:      Years since quittin.9     Passive exposure: Past    Smokeless tobacco: Never   Vaping Use    Vaping status: Never Used   Substance and Sexual Activity    Alcohol use: Yes     Comment: rare/social    Drug use: Never    Sexual activity: Yes     Partners: Female        Medications (Active prior to today's  visit):  Current Outpatient Medications   Medication Sig Dispense Refill    Cyanocobalamin (VITAMIN B-12 CR OR) Take by mouth.      MISC NATURAL PRODUCTS OR Take 1 tablet by mouth daily. Reishi      azelastine 137 MCG/SPRAY Nasal Solution 1-2 sprays by Nasal route in the morning and 1-2 sprays before bedtime. FOR SINUS SYMPTOMS/NASAL CONGESTION.. 30 mL 11    fluticasone propionate 50 MCG/ACT Nasal Suspension 2 sprays by Each Nare route daily. FOR NASAL CONGESTION/SINUS SYMPTOMS. 16 each 11    Ergocalciferol (VITAMIN D OR) Take by mouth daily.      Albuterol Sulfate 108 (90 Base) MCG/ACT Inhalation Aerosol Powder, Breath Activated Inhale into the lungs. 2 puffs inhalation every 4 hours      methylPREDNISolone 4 MG Oral Tablet Therapy Pack Take as directed with food. (Patient not taking: Reported on 12/24/2024) 1 each 0       Allergies:  Allergies[1]      ROS:   Review of Systems   Constitutional:  Negative for activity change, appetite change, chills, diaphoresis, fatigue, fever and unexpected weight change.   HENT:  Positive for facial swelling, postnasal drip and sinus pressure. Negative for congestion, ear discharge, ear pain, hearing loss, mouth sores, rhinorrhea, sinus pain, sneezing, sore throat, tinnitus, trouble swallowing and voice change.         Facial erythema   Eyes:  Negative for pain, discharge, redness and itching.   Respiratory:  Negative for apnea, cough, choking, chest tightness, shortness of breath, wheezing and stridor.    Cardiovascular:  Negative for chest pain and palpitations.   Gastrointestinal:  Negative for abdominal distention, abdominal pain, constipation, diarrhea, nausea and vomiting.   Endocrine: Negative for cold intolerance and heat intolerance.   Musculoskeletal:  Negative for arthralgias, joint swelling and myalgias.   Skin:  Negative for pallor and rash.   Allergic/Immunologic: Negative for environmental allergies, food allergies and immunocompromised state.   Neurological:   Negative for headaches.   Psychiatric/Behavioral:  Negative for behavioral problems and sleep disturbance.           PHYSICAL EXAM:   Physical Exam  Constitutional:       Appearance: He is normal weight.   HENT:      Head: No right periorbital erythema or left periorbital erythema.      Right Ear: Tympanic membrane normal. There is no impacted cerumen.      Left Ear: Tympanic membrane normal. There is no impacted cerumen.      Nose: No congestion or rhinorrhea.      Mouth/Throat:      Pharynx: No oropharyngeal exudate or posterior oropharyngeal erythema.   Eyes:      General: Lids are normal. No allergic shiner.        Right eye: No discharge.         Left eye: No discharge.      Conjunctiva/sclera: Conjunctivae normal.      Right eye: Right conjunctiva is not injected. No exudate.     Left eye: Left conjunctiva is not injected. No exudate.  Cardiovascular:      Rate and Rhythm: Normal rate and regular rhythm.      Heart sounds: Normal heart sounds.   Pulmonary:      Effort: No tachypnea, prolonged expiration or respiratory distress.      Breath sounds: No stridor or decreased air movement. No decreased breath sounds, wheezing, rhonchi or rales.   Skin:     Coloration: Skin is not cyanotic or pale.      Findings: No acne, ecchymosis, erythema, petechiae or rash. Rash is not macular, nodular, papular, purpuric, pustular, scaling, urticarial or vesicular.           ASSESSMENT   Assessment   Encounter Diagnoses   Name Primary?    Anaphylaxis, initial encounter Yes    History of penicillin-type antibiotic allergy     Facial edema     Facial erythema        PLAN     We will send patient for blood test-RAST testing, to check for possible allergy to penicillin or amoxicillin.    If negative, patient could consider scheduling an oral challenge to amoxicillin in our office-[facility] in [facility].    Patient will follow-up in 2 to 3 weeks.    We discussed the high probability that the patient sustained a  fairly common side effect to high-dose oral steroid therapy, which improved as the dose of the Medrol medication was tapered.       Orders This Visit:  Orders Placed This Encounter   Procedures    Allergen, Amoxicillin    Penicillin G    Penicillin V       Meds This Visit:  Requested Prescriptions      No prescriptions requested or ordered in this encounter       Imaging & Referrals:  None     12/24/2024  Robbie Crum MD      If medication samples were provided today, they were provided solely for patient education and training related to self administration of these medications.  Teaching, instruction and sample was provided to the patient by myself.  Teaching included  a review of potential adverse side effects as well as potential efficacy.  Patient's questions were answered in regards to medication administration and dosing and potential side effects. Teaching was provided via the teach back method       [1]   Allergies  Allergen Reactions    Amoxicillin-Pot Clavulanate SWELLING and FACE FLUSHING    Seasonal OTHER (SEE COMMENTS)     congestion

## 2024-12-26 ENCOUNTER — PATIENT MESSAGE (OUTPATIENT)
Dept: PULMONOLOGY | Facility: CLINIC | Age: 39
End: 2024-12-26

## 2024-12-26 DIAGNOSIS — G47.33 OSA (OBSTRUCTIVE SLEEP APNEA): Primary | ICD-10-CM

## 2024-12-30 LAB
AMOXICILLIN IGE: <0.1 KU/L
AMOXICILLIN IGE: <0.1 KU/L
C001-IGE PENICILLOYL G: <0.1 KU/L
C002-IGE PENICILLOYL V: <0.1 KU/L
Lab: 0

## 2025-01-06 ENCOUNTER — TELEPHONE (OUTPATIENT)
Dept: ALLERGY | Facility: CLINIC | Age: 40
End: 2025-01-06

## 2025-01-06 ENCOUNTER — TELEPHONE (OUTPATIENT)
Dept: PULMONOLOGY | Facility: CLINIC | Age: 40
End: 2025-01-06

## 2025-01-06 NOTE — TELEPHONE ENCOUNTER
Patient called to speak with a nurse in regards to his CPAP machine. Patient states that he wakes up gasping for air at night with the CPAP machine on. Patient is concerned. Please call.

## 2025-01-06 NOTE — TELEPHONE ENCOUNTER
----- Message from Robbie Crum sent at 12/31/2024  8:04 AM CST -----  Okay for nursing staff to call patient with negative blood allergy tests to penicillin / amoxicillin.  Patient can schedule oral challenge if he desires.  But, we discussed the extremely likely possibility that his \"reaction\" was merely a very common side effect  To the oral steroids that he was also taking.  KALPANA

## 2025-01-06 NOTE — TELEPHONE ENCOUNTER
Spoke with patient. Verified name and . Informed patient of negative test results for Penicillin and recommendations for possible Oral Challenge per Dr. Crum. Patient verbalizes understanding and declines an Oral Challenge at this time.

## 2025-01-09 NOTE — TELEPHONE ENCOUNTER
Patient states he is doing much better on lower pressure setting. (Pressure decreased on 1/6/25- see [employer] message 12/26/25)

## 2025-02-17 ENCOUNTER — OFFICE VISIT (OUTPATIENT)
Dept: INTERNAL MEDICINE CLINIC | Facility: CLINIC | Age: 40
End: 2025-02-17
Payer: COMMERCIAL

## 2025-02-17 VITALS
DIASTOLIC BLOOD PRESSURE: 85 MMHG | SYSTOLIC BLOOD PRESSURE: 123 MMHG | BODY MASS INDEX: 37 KG/M2 | TEMPERATURE: 98 F | WEIGHT: 245 LBS | OXYGEN SATURATION: 98 % | HEART RATE: 102 BPM

## 2025-02-17 DIAGNOSIS — J01.90 ACUTE BACTERIAL SINUSITIS: Primary | ICD-10-CM

## 2025-02-17 DIAGNOSIS — B96.89 ACUTE BACTERIAL SINUSITIS: Primary | ICD-10-CM

## 2025-02-17 DIAGNOSIS — J21.8 ACUTE BRONCHIOLITIS DUE TO OTHER SPECIFIED ORGANISMS: ICD-10-CM

## 2025-02-17 PROCEDURE — 99213 OFFICE O/P EST LOW 20 MIN: CPT | Performed by: STUDENT IN AN ORGANIZED HEALTH CARE EDUCATION/TRAINING PROGRAM

## 2025-02-17 PROCEDURE — 3079F DIAST BP 80-89 MM HG: CPT | Performed by: STUDENT IN AN ORGANIZED HEALTH CARE EDUCATION/TRAINING PROGRAM

## 2025-02-17 PROCEDURE — 3074F SYST BP LT 130 MM HG: CPT | Performed by: STUDENT IN AN ORGANIZED HEALTH CARE EDUCATION/TRAINING PROGRAM

## 2025-02-17 RX ORDER — METHYLPREDNISOLONE 4 MG/1
TABLET ORAL
Qty: 1 EACH | Refills: 0 | Status: SHIPPED | OUTPATIENT
Start: 2025-02-17

## 2025-02-17 RX ORDER — BENZONATATE 100 MG/1
100 CAPSULE ORAL 3 TIMES DAILY PRN
COMMUNITY

## 2025-02-17 RX ORDER — AZELASTINE HYDROCHLORIDE 137 UG/1
1-2 SPRAY, METERED NASAL 2 TIMES DAILY
Qty: 30 ML | Refills: 11 | Status: SHIPPED | OUTPATIENT
Start: 2025-02-17

## 2025-02-17 RX ORDER — DOXYCYCLINE 100 MG/1
100 CAPSULE ORAL 2 TIMES DAILY
Qty: 20 CAPSULE | Refills: 0 | Status: SHIPPED | OUTPATIENT
Start: 2025-02-17 | End: 2025-02-27

## 2025-02-17 RX ORDER — BENZONATATE 200 MG/1
200 CAPSULE ORAL 3 TIMES DAILY PRN
Qty: 90 CAPSULE | Refills: 0 | Status: SHIPPED | OUTPATIENT
Start: 2025-02-17 | End: 2025-03-19

## 2025-02-17 RX ORDER — CODEINE PHOSPHATE AND GUAIFENESIN 10; 100 MG/5ML; MG/5ML
10 SOLUTION ORAL EVERY 6 HOURS PRN
Qty: 118 ML | Refills: 0 | Status: SHIPPED | OUTPATIENT
Start: 2025-02-17 | End: 2025-02-24

## 2025-02-17 RX ORDER — FLUTICASONE PROPIONATE 50 MCG
2 SPRAY, SUSPENSION (ML) NASAL DAILY
Qty: 1 EACH | Refills: 11 | Status: SHIPPED | OUTPATIENT
Start: 2025-02-17

## 2025-02-17 NOTE — PROGRESS NOTES
OFFICE NOTE       The following individual(s) verbally consented to be recorded using ambient AI listening technology and understand that they can each withdraw their consent to this listening technology at any point by asking the clinician to turn off or pause the recording:    Patient name: Ghulam Loving  Additional names:            Patient ID: Ghulam Loving is a 39 year old male.  Today's Date: 02/17/25  Chief Complaint: Cough (C/o cough, fevers, congestion, right sided facial pain, shortness of breath x 4 days)    Pt is a 37y/o male with PMHx of BHAVANA (on CPAP followed by bam Bains PA-C) Chronic Sinusitis (on nasal lavage every night) whom presents to clinic with recent cough, fever.     History of Present Illness  Ghulam Loving is a 39 year old male who presents with cough, fever, and sinus congestion. He is accompanied by his wife, who is a nurse.    Symptoms began last Thursday with a cough, followed by chills and back pain on Friday evening, which he associates with the onset of fever. He left work early and noted intermittent fevers throughout Friday night. On Saturday, he experienced persistent fevers ranging from 100 to 101 degrees Fahrenheit, which would subside and then return. By Sunday, he developed a persistent cough described as 'nonstop'.    By Sunday, he developed congestion, sneezing, and right-sided facial pain. He also reports an earache and excessive tearing from his right eye, which sometimes impairs his vision.    He experiences dizziness, stating 'every time I would cough, I feel like the room was spinning.' His wife noted rattling sounds in his lungs and decreased air movement in the lower lungs.    He has been using over-the-counter medications including Theraflu and Robitussin since Friday. He also completed a previous prescription of Tessalon, which he feels may have helped reduce his coughing at times, particularly at night. However, he notes  that the cough resumes once he is active during the day.       Vitals:    02/17/25 1322   BP: 123/85   Pulse: 102   Temp: 98.2 °F (36.8 °C)   TempSrc: Oral   SpO2: 98%   Weight: 245 lb (111.1 kg)     body mass index is 36.71 kg/m².  BP Readings from Last 3 Encounters:   02/17/25 123/85   12/24/24 132/87   10/29/24 114/74     The 10-year ASCVD risk score (Caryn GUTIERREZ, et al., 2019) is: 2%    Values used to calculate the score:      Age: 39 years      Sex: Male      Is Non- : No      Diabetic: No      Tobacco smoker: No      Systolic Blood Pressure: 123 mmHg      Is BP treated: No      HDL Cholesterol: 34 mg/dL      Total Cholesterol: 201 mg/dL  Results  DIAGNOSTIC  Frontal maxillary sinusitis: Tenderness and fullness on examination (02/16/2025)  Bronchitis: Bilateral bronchitis with faint wheezes and diminished breath sounds (02/17/2025)       Medications reviewed:  Current Outpatient Medications   Medication Sig Dispense Refill    benzonatate 100 MG Oral Cap Take 1 capsule (100 mg total) by mouth 3 (three) times daily as needed for cough.      azelastine 137 MCG/SPRAY Nasal Solution 1-2 sprays by Nasal route in the morning and 1-2 sprays before bedtime. FOR SINUS SYMPTOMS/NASAL CONGESTION.. 30 mL 11    fluticasone propionate 50 MCG/ACT Nasal Suspension 2 sprays by Each Nare route daily. FOR NASAL CONGESTION/SINUS SYMPTOMS. 1 each 11    Benzocaine-Menthol 6-10 MG Mouth/Throat Lozenge Take 1 lozenge by mouth every 2 (two) hours as needed for Pain. 20 lozenge 0    methylPREDNISolone 4 MG Oral Tablet Therapy Pack Take as directed with food. 1 each 0    doxycycline 100 MG Oral Cap Take 1 capsule (100 mg total) by mouth 2 (two) times daily for 10 days. 20 capsule 0    benzonatate 200 MG Oral Cap Take 1 capsule (200 mg total) by mouth 3 (three) times daily as needed for cough (FOR COUGH). 90 capsule 0    guaiFENesin-codeine 100-10 MG/5ML Oral Solution Take 10 mL by mouth every 6 (six) hours as needed  for cough. 118 mL 0    Cyanocobalamin (VITAMIN B-12 CR OR) Take by mouth.      MISC NATURAL PRODUCTS OR Take 1 tablet by mouth daily. Reishi      Ergocalciferol (VITAMIN D OR) Take by mouth daily.      Albuterol Sulfate 108 (90 Base) MCG/ACT Inhalation Aerosol Powder, Breath Activated Inhale into the lungs. 2 puffs inhalation every 4 hours      azelastine 137 MCG/SPRAY Nasal Solution 1-2 sprays by Nasal route in the morning and 1-2 sprays before bedtime. FOR SINUS SYMPTOMS/NASAL CONGESTION.. (Patient not taking: Reported on 2/17/2025) 30 mL 11    fluticasone propionate 50 MCG/ACT Nasal Suspension 2 sprays by Each Nare route daily. FOR NASAL CONGESTION/SINUS SYMPTOMS. (Patient not taking: Reported on 2/17/2025) 16 each 11    methylPREDNISolone 4 MG Oral Tablet Therapy Pack Take as directed with food. (Patient not taking: Reported on 2/17/2025) 1 each 0         Assessment & Plan    1. Acute bacterial sinusitis (Primary)  -     Azelastine HCl; 1-2 sprays by Nasal route in the morning and 1-2 sprays before bedtime. FOR SINUS SYMPTOMS/NASAL CONGESTION..  Dispense: 30 mL; Refill: 11  -     Fluticasone Propionate; 2 sprays by Each Nare route daily. FOR NASAL CONGESTION/SINUS SYMPTOMS.  Dispense: 1 each; Refill: 11  -     Benzocaine-Menthol; Take 1 lozenge by mouth every 2 (two) hours as needed for Pain.  Dispense: 20 lozenge; Refill: 0  -     methylPREDNISolone; Take as directed with food.  Dispense: 1 each; Refill: 0  -     Doxycycline Hyclate; Take 1 capsule (100 mg total) by mouth 2 (two) times daily for 10 days.  Dispense: 20 capsule; Refill: 0  2. Acute bronchiolitis due to other specified organisms  -     methylPREDNISolone; Take as directed with food.  Dispense: 1 each; Refill: 0  -     Doxycycline Hyclate; Take 1 capsule (100 mg total) by mouth 2 (two) times daily for 10 days.  Dispense: 20 capsule; Refill: 0  -     Benzonatate; Take 1 capsule (200 mg total) by mouth 3 (three) times daily as needed for cough (FOR  COUGH).  Dispense: 90 capsule; Refill: 0  -     guaiFENesin-Codeine; Take 10 mL by mouth every 6 (six) hours as needed for cough.  Dispense: 118 mL; Refill: 0    Assessment & Plan  Acute Sinusitis  Right-sided facial pain, congestion, and tearing. Tenderness and fullness on examination.  -Continue intranasal Flonase and azelastine.  -Start doxycycline 100mg twice daily for 10 days.  -Start Medrol dose pack.    Bronchitis  Cough, wheezing, and diminished breath sounds bilaterally.  -Continue doxycycline and Medrol dose pack as above.  -Continue Tessalon perles as needed for cough.  -Add guaifenesin with codeine 10ml at bedtime for cough.    Follow-up  If symptoms persist after 10 days, return for reassessment and possible chest x-ray.       Follow Up: As needed/if symptoms worsen or No follow-ups on file..         Objective/ Results:   Physical Exam  Constitutional:       Appearance: He is well-developed.   Cardiovascular:      Rate and Rhythm: Normal rate and regular rhythm.      Heart sounds: Normal heart sounds.   Pulmonary:      Effort: Pulmonary effort is normal.      Breath sounds: Wheezing and rhonchi present.   Abdominal:      General: Bowel sounds are normal.      Palpations: Abdomen is soft.   Skin:     General: Skin is warm and dry.   Neurological:      Mental Status: He is alert and oriented to person, place, and time.      Deep Tendon Reflexes: Reflexes are normal and symmetric.        Physical Exam  HEENT: Eardrums intact, no fluid or cerumen bilaterally. Erythema and enlarged turbinates. Frontal maxillary sinus tenderness and fullness.  CHEST: Faint wheezes and diminished air entry bilaterally.     Reviewed:    Patient Active Problem List    Diagnosis    BHAVANA (obstructive sleep apnea)      Allergies[1]     Social History     Socioeconomic History    Marital status:    Tobacco Use    Smoking status: Former     Current packs/day: 0.00     Types: Cigarettes     Quit date: 2002     Years since  quittin.1     Passive exposure: Past    Smokeless tobacco: Never   Vaping Use    Vaping status: Never Used   Substance and Sexual Activity    Alcohol use: Yes     Comment: rare/social    Drug use: Never    Sexual activity: Yes     Partners: Female      Review of Systems   Constitutional:  Positive for fatigue and fever.   HENT:  Positive for congestion.    Respiratory:  Positive for cough and wheezing.    Cardiovascular: Negative.    Gastrointestinal: Negative.    Skin: Negative.    Neurological: Negative.        All other systems negative unless otherwise stated in ROS or HPI above.       Yazan Pringle MD  Internal Medicine       Call office with any questions or seek emergency care if necessary.   Patient understands and agrees to follow directions and advice.      ----------------------------------------- PATIENT INSTRUCTIONS-----------------------------------------     There are no Patient Instructions on file for this visit.        The  [employer] Cures Act makes medical notes available to patients in the interest of transparency.  However, please be advised that this is a medical document.  It is intended as a peer to peer communication.  It is written in medical language and may contain abbreviations or verbiage that are technical and unfamiliar.  It may appear blunt or direct.  Medical documents are intended to carry relevant information, facts as evident, and the clinical opinion of the practitioner.          [1]   Allergies  Allergen Reactions    Amoxicillin-Pot Clavulanate SWELLING and FACE FLUSHING    Seasonal OTHER (SEE COMMENTS)     congestion

## 2025-03-25 ENCOUNTER — OFFICE VISIT (OUTPATIENT)
Dept: PULMONOLOGY | Facility: CLINIC | Age: 40
End: 2025-03-25
Payer: COMMERCIAL

## 2025-03-25 VITALS
WEIGHT: 245 LBS | OXYGEN SATURATION: 97 % | SYSTOLIC BLOOD PRESSURE: 127 MMHG | BODY MASS INDEX: 36.71 KG/M2 | HEIGHT: 68.5 IN | DIASTOLIC BLOOD PRESSURE: 77 MMHG | HEART RATE: 101 BPM

## 2025-03-25 DIAGNOSIS — G47.33 OSA ON CPAP: Primary | ICD-10-CM

## 2025-03-25 PROCEDURE — 99213 OFFICE O/P EST LOW 20 MIN: CPT | Performed by: PHYSICIAN ASSISTANT

## 2025-03-25 NOTE — PROGRESS NOTES
Pulmonary Progress Note    History of Present Illness:  Ghulam Loving is a 39 year old male presenting to pulmonary clinic today for follow-up of severe BHAVANA. He was titrated to CPAP 14 CWP. He has abdominal bloating on CPAP 14 CWP and thus, we empirically reduced pressure to CPAP 12 CWP. States his wife still reports witnessed apneic events while wearing CPAP. He does not notice any improvement in daytime sleepiness. His sleep is still unrefreshing. He used to toss and turn frequently and he has noticed improvement in nocturnal awakenings. Morning headaches are less frequent. Data download demonstrates average daily usage of 7 hours and 23 minutes with residual respiratory events of 1.3/h on CPAP 12 CWP.    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  HEENT: +Nasal congestion.  Cardio: No chest pain.  Respiratory: No shortness of breath.  GI: No acid reflux.  Extremities: No lower extremity swelling or pain.   Neurologic: No headache.  Psych: No depression.     Physical Exam:  /77   Pulse 101   Ht 5' 8.5\" (1.74 m)   Wt 245 lb (111.1 kg)   SpO2 97%   BMI 36.71 kg/m²    Constitutional: Obese. No acute distress.  HEENT: Head NC/AT. PEERL. No tonsillar or uvula enlargement. Mallampati class 1.  Cardio: Regular rate and rhythm. Normal S1 and S2. No murmurs.   Respiratory: Thorax symmetrical with no labored breathing. Clear to ausculation bilaterally with symmetrical breath sounds. No wheezing, rhonchi, or crackles.   Extremities: No clubbing or cyanosis.  Neurologic: A&Ox3. No gross motor deficits.  Skin: Warm, dry.  Psych: Pleasant affect. Cooperative.    Results:  -Polysomnography 6/2024: AHI 70, REM AHI 25, supine AHI 91.8, oxygen so 79%    -CPAP titration study 12/2024: CPAP 14  CWP    Assessment/Plan:  Severe BHAVANA - tolerating CPAP. Data download demonstrates excellent compliance and efficacy. Continues to complain of excessive daytime sleepiness which does not seem related to BHAVANA as this is well treated. Recommend he increase total sleep time by 1-2 hours.  Plan:  -Vigilance with CPAP every night all night  -Increase total sleep time by 1-2 hours  -Weight loss  -Avoid alcohol  -Avoid sedating drugs  -Never drive if sleepy  -Follow-up at the 1-year interval again with download of data  -Call if new problems in the interim    Marcos Bains PA-C  Pulmonary Medicine  3/25/2025

## (undated) NOTE — Clinical Note
10/29/2024          To Whom It May Concern:    Ghulam Loving is currently under my medical care and may not return to {em school/work:791635018} at this time.    Please excuse Ghulam for {NUMBERS 0-10:3282} {days weeks:3323}.  {HE/SHE :6250} may return to {em school/work:409499298} on ***.  Activity is restricted as follows: {EM SCHOOL/WORK RESTRICTIONS:789097418}.    If you require additional information please contact our office.        Sincerely,    Yazan Pringle MD          Document generated by:  CÉSAR Henderson

## (undated) NOTE — LETTER
10/29/2024              Ghulam Loving        [address] [facility] [address]         To Whom it may concern:    This is to certify that Ghulam Loving had an appointment on 10/29/2024 with Yazan Pringle MD.  Please excuse from work today.      Sincerely,    Yazan Pringle MD  [facility]  [address]  [place] [id number] [phone]        Document electronically generated by:  CÉSAR Henderson

## (undated) NOTE — LETTER
2/17/2025          To Whom It May Concern:    Ghulam Loving is currently under my medical care and may not return to work at this time.    He may return to work on Wednesday, February 19th, 2025.    If you require additional information please contact our office.        Sincerely,    Yazan Pringle MD          Document generated by:  Yazan Pringle MD